# Patient Record
Sex: FEMALE | Race: BLACK OR AFRICAN AMERICAN | NOT HISPANIC OR LATINO | Employment: OTHER | URBAN - METROPOLITAN AREA
[De-identification: names, ages, dates, MRNs, and addresses within clinical notes are randomized per-mention and may not be internally consistent; named-entity substitution may affect disease eponyms.]

---

## 2017-04-24 ENCOUNTER — LAB VISIT (OUTPATIENT)
Dept: LAB | Facility: HOSPITAL | Age: 67
End: 2017-04-24
Attending: INTERNAL MEDICINE
Payer: MEDICARE

## 2017-04-24 ENCOUNTER — OFFICE VISIT (OUTPATIENT)
Dept: FAMILY MEDICINE | Facility: CLINIC | Age: 67
End: 2017-04-24
Payer: MEDICARE

## 2017-04-24 VITALS
WEIGHT: 177.69 LBS | TEMPERATURE: 98 F | BODY MASS INDEX: 31.48 KG/M2 | HEIGHT: 63 IN | DIASTOLIC BLOOD PRESSURE: 90 MMHG | SYSTOLIC BLOOD PRESSURE: 170 MMHG | HEART RATE: 78 BPM | OXYGEN SATURATION: 98 %

## 2017-04-24 DIAGNOSIS — Z11.59 NEED FOR HEPATITIS C SCREENING TEST: ICD-10-CM

## 2017-04-24 DIAGNOSIS — E11.42 TYPE 2 DIABETES MELLITUS WITH DIABETIC POLYNEUROPATHY, WITH LONG-TERM CURRENT USE OF INSULIN: Primary | ICD-10-CM

## 2017-04-24 DIAGNOSIS — Z12.31 ENCOUNTER FOR SCREENING MAMMOGRAM FOR MALIGNANT NEOPLASM OF BREAST: ICD-10-CM

## 2017-04-24 DIAGNOSIS — Z12.4 ENCOUNTER FOR SCREENING FOR CERVICAL CANCER: ICD-10-CM

## 2017-04-24 DIAGNOSIS — E78.2 MIXED HYPERLIPIDEMIA: Chronic | ICD-10-CM

## 2017-04-24 DIAGNOSIS — Z79.4 TYPE 2 DIABETES MELLITUS WITH DIABETIC POLYNEUROPATHY, WITH LONG-TERM CURRENT USE OF INSULIN: Primary | ICD-10-CM

## 2017-04-24 DIAGNOSIS — I10 ESSENTIAL HYPERTENSION: Chronic | ICD-10-CM

## 2017-04-24 DIAGNOSIS — Z79.4 TYPE 2 DIABETES MELLITUS WITH DIABETIC POLYNEUROPATHY, WITH LONG-TERM CURRENT USE OF INSULIN: ICD-10-CM

## 2017-04-24 DIAGNOSIS — E11.42 TYPE 2 DIABETES MELLITUS WITH DIABETIC POLYNEUROPATHY, WITH LONG-TERM CURRENT USE OF INSULIN: ICD-10-CM

## 2017-04-24 PROBLEM — E11.9 TYPE 2 DIABETES MELLITUS WITHOUT COMPLICATION: Chronic | Status: ACTIVE | Noted: 2017-04-24

## 2017-04-24 LAB
ALBUMIN SERPL BCP-MCNC: 3.5 G/DL
ALP SERPL-CCNC: 118 U/L
ALT SERPL W/O P-5'-P-CCNC: 21 U/L
ANION GAP SERPL CALC-SCNC: 8 MMOL/L
AST SERPL-CCNC: 14 U/L
BILIRUB SERPL-MCNC: 0.6 MG/DL
BUN SERPL-MCNC: 14 MG/DL
CALCIUM SERPL-MCNC: 9.4 MG/DL
CHLORIDE SERPL-SCNC: 106 MMOL/L
CHOLEST/HDLC SERPL: 5.3 {RATIO}
CO2 SERPL-SCNC: 26 MMOL/L
CREAT SERPL-MCNC: 1.1 MG/DL
EST. GFR  (AFRICAN AMERICAN): >60 ML/MIN/1.73 M^2
EST. GFR  (NON AFRICAN AMERICAN): 52.8 ML/MIN/1.73 M^2
ESTIMATED AVG GLUCOSE: 258 MG/DL
GLUCOSE SERPL-MCNC: 265 MG/DL
HBA1C MFR BLD HPLC: 10.6 %
HDL/CHOLESTEROL RATIO: 18.8 %
HDLC SERPL-MCNC: 191 MG/DL
HDLC SERPL-MCNC: 36 MG/DL
LDLC SERPL CALC-MCNC: 131.6 MG/DL
NONHDLC SERPL-MCNC: 155 MG/DL
POTASSIUM SERPL-SCNC: 3.6 MMOL/L
PROT SERPL-MCNC: 7.7 G/DL
SODIUM SERPL-SCNC: 140 MMOL/L
TRIGL SERPL-MCNC: 117 MG/DL
TSH SERPL DL<=0.005 MIU/L-ACNC: 0.75 UIU/ML

## 2017-04-24 PROCEDURE — 99203 OFFICE O/P NEW LOW 30 MIN: CPT | Mod: PBBFAC,PO | Performed by: INTERNAL MEDICINE

## 2017-04-24 PROCEDURE — 86803 HEPATITIS C AB TEST: CPT

## 2017-04-24 PROCEDURE — 80061 LIPID PANEL: CPT

## 2017-04-24 PROCEDURE — 99999 PR PBB SHADOW E&M-NEW PATIENT-LVL III: CPT | Mod: PBBFAC,,, | Performed by: INTERNAL MEDICINE

## 2017-04-24 PROCEDURE — 83036 HEMOGLOBIN GLYCOSYLATED A1C: CPT

## 2017-04-24 PROCEDURE — 99499 UNLISTED E&M SERVICE: CPT | Mod: ,,, | Performed by: INTERNAL MEDICINE

## 2017-04-24 PROCEDURE — 84443 ASSAY THYROID STIM HORMONE: CPT

## 2017-04-24 PROCEDURE — 99203 OFFICE O/P NEW LOW 30 MIN: CPT | Mod: S$GLB,,, | Performed by: INTERNAL MEDICINE

## 2017-04-24 PROCEDURE — 80053 COMPREHEN METABOLIC PANEL: CPT

## 2017-04-24 PROCEDURE — 36415 COLL VENOUS BLD VENIPUNCTURE: CPT | Mod: PO

## 2017-04-24 PROCEDURE — 99499 UNLISTED E&M SERVICE: CPT | Mod: S$GLB,,, | Performed by: INTERNAL MEDICINE

## 2017-04-24 RX ORDER — INSULIN GLARGINE 100 [IU]/ML
40 INJECTION, SOLUTION SUBCUTANEOUS DAILY
COMMUNITY
End: 2017-04-24 | Stop reason: SDUPTHER

## 2017-04-24 RX ORDER — METOPROLOL TARTRATE 100 MG/1
100 TABLET ORAL 2 TIMES DAILY
Qty: 180 TABLET | Refills: 3 | Status: SHIPPED | OUTPATIENT
Start: 2017-04-24

## 2017-04-24 RX ORDER — FENOFIBRATE 134 MG/1
134 CAPSULE ORAL
COMMUNITY
End: 2017-04-24 | Stop reason: SDUPTHER

## 2017-04-24 RX ORDER — DEXTROSE 4 G
1 TABLET,CHEWABLE ORAL DAILY
Qty: 1 EACH | Refills: 0 | Status: SHIPPED | OUTPATIENT
Start: 2017-04-24 | End: 2017-05-08 | Stop reason: SDUPTHER

## 2017-04-24 RX ORDER — LANCETS
1 EACH MISCELLANEOUS 2 TIMES DAILY
Qty: 100 EACH | Refills: 11 | Status: SHIPPED | OUTPATIENT
Start: 2017-04-24 | End: 2017-05-08 | Stop reason: SDUPTHER

## 2017-04-24 RX ORDER — METFORMIN HYDROCHLORIDE 1000 MG/1
1000 TABLET ORAL 2 TIMES DAILY WITH MEALS
Qty: 180 TABLET | Refills: 3 | Status: SHIPPED | OUTPATIENT
Start: 2017-04-24

## 2017-04-24 RX ORDER — METFORMIN HYDROCHLORIDE 1000 MG/1
1000 TABLET ORAL 2 TIMES DAILY WITH MEALS
COMMUNITY
End: 2017-04-24 | Stop reason: SDUPTHER

## 2017-04-24 RX ORDER — METOPROLOL TARTRATE 100 MG/1
100 TABLET ORAL 2 TIMES DAILY
COMMUNITY
End: 2017-04-24 | Stop reason: SDUPTHER

## 2017-04-24 RX ORDER — INSULIN GLARGINE 100 [IU]/ML
10 INJECTION, SOLUTION SUBCUTANEOUS DAILY
Qty: 3 ML | Refills: 11 | Status: SHIPPED | OUTPATIENT
Start: 2017-04-24

## 2017-04-24 RX ORDER — FENOFIBRATE 134 MG/1
134 CAPSULE ORAL
Qty: 90 CAPSULE | Refills: 3 | Status: SHIPPED | OUTPATIENT
Start: 2017-04-24

## 2017-04-24 NOTE — PROGRESS NOTES
Assessment & Plan  Type 2 diabetes mellitus with diabetic polyneuropathy, with long-term current use of insulin-per pharmacy records, last prescription was for Lantus 40 units though she's only been taking 10 units.  Check A1c.  She's been out of her medicines for a month so I expect that it will be high.  Refilled metformin, refilled insulin.  Refilled for glucometer and test strips and lancets.  Twice daily testing for now.  Fasting today, check CMP, lipid, TSH, A1c.  She'll need microalbumin in the future but given her uncontrolled pressures, would wait for now.  -     Diabetic Eye Screening Photo; Future  -     metformin (GLUCOPHAGE) 1000 MG tablet; Take 1 tablet (1,000 mg total) by mouth 2 (two) times daily with meals.  Dispense: 180 tablet; Refill: 3  -     insulin glargine (LANTUS SOLOSTAR) 100 unit/mL (3 mL) InPn pen; Inject 10 Units into the skin once daily.  Dispense: 3 mL; Refill: 11  -     Comprehensive metabolic panel; Future; Expected date: 4/24/17  -     Lipid panel; Future; Expected date: 4/24/17  -     Hemoglobin A1c; Future; Expected date: 4/24/17  -     TSH; Future; Expected date: 4/24/17    Mixed hyperlipidemia-refilled fenofibrate.  Unclear if she has tried statins in the past with intolerance.  Old records request.  Check lipid profile today.  Out of meds for the past month.  -     fenofibrate micronized (LOFIBRA) 134 MG Cap; Take 1 capsule (134 mg total) by mouth daily with breakfast.  Dispense: 90 capsule; Refill: 3    Essential hypertension-not to goal today.  Unsure if she's had ACE inhibitor or ARB in the past.  Currently on beta blocker.  Old records.  Refilled today.  -     metoprolol tartrate (LOPRESSOR) 100 MG tablet; Take 1 tablet (100 mg total) by mouth 2 (two) times daily.  Dispense: 180 tablet; Refill: 3    Encounter for screening for cervical cancer   -     Ambulatory referral to Gynecology    Encounter for screening mammogram for malignant neoplasm of breast  -     Mammo Digital  Screening Bilateral With CAD; Future; Expected date: 4/24/17    Need for hepatitis C screening test  -     Hepatitis C antibody; Future; Expected date: 4/24/17    Other orders  -     blood-glucose meter Misc; 1 each by Misc.(Non-Drug; Combo Route) route once daily.  Dispense: 1 each; Refill: 0  -     blood sugar diagnostic Strp; TWICE DAILY BLOOD SUGAR TESTING; WHICHEVER BRAND COVERED BY INSURANCE  Dispense: 60 strip; Refill: 11  -     lancets (ACCU-CHEK SOFTCLIX LANCETS) Misc; 1 each by Misc.(Non-Drug; Combo Route) route 2 (two) times daily. Whichever brand covered by insurance  Dispense: 100 each; Refill: 11      Medications Discontinued During This Encounter   Medication Reason    metformin (GLUCOPHAGE) 1000 MG tablet Reorder    metoprolol tartrate (LOPRESSOR) 100 MG tablet Reorder    fenofibrate micronized (LOFIBRA) 134 MG Cap Reorder    insulin glargine (LANTUS SOLOSTAR) 100 unit/mL (3 mL) InPn pen Reorder       Follow-up: No Follow-up on file. old records request.  Labs as above.  Follow-up 2 weeks, follow-up blood pressure, lab test results.  Consider gabapentin for the peripheral neuropathy.  She'll need a foot exam.  Old records request, hopefully will be able to give insight into previously tried medications and any medication ALLERGIES.      =================================================================      Chief Complaint   Patient presents with    Establish Care    Medication Refill       LAKEISHA  Viviana is a 66 y.o. female, last appointment with this clinic was Visit date not found.    No LMP recorded. Patient is postmenopausal.    Out of medication for the past month.  Previous PCP Dr. Church - she only works on Fridays.  But apparently there was an issue with the listed PCP on her medical card. Was seeing [facility].    Knows she takes several medicines but did not bring in any of her medicines, does not know the names/doses, does not know the number of meds total that she  takes.    Does know she takes metformin.  Is also taking insulin.    Has a home cuff but doesn't trust it.     Has 4 biological children but caring for 3 grandchildren currently.     BP very high.  Notes high stress this AM getting kids out of bed.  Better on repeat but still very high.  No chest pain, dyspnea. No headache.    Gets her meds mail order but we were able to call the local pharmacy and get list.  Notes insulin she uses 10 units.  Metformin 1000 BID.  toprol 100 mg.      Notes peripheral neuropathy - burning in the feet; never meds for this.    Eye doctor Dr. Carrizales - had cataract surgery.  < 1 year ago.  Reports that was the last time she had had labs done.  Fasting today.    Needs to update pap smear.  And mammo.    Patient Care Team:  Cory Buenrostro MD as PCP - General (Internal Medicine)    There are no active problems to display for this patient.      PAST MEDICAL HISTORY:  Past Medical History:   Diagnosis Date    Diabetes mellitus, type 2     Hypertension     Hyperthyroidism        PAST SURGICAL HISTORY:  History reviewed. No pertinent surgical history.    Family History   Problem Relation Age of Onset    Hypertension Mother     Hypertension Father     Hypertension Sister     Diabetes Sister     Hypertension Sister     Diabetes Sister     No Known Problems Daughter     No Known Problems Daughter     No Known Problems Daughter     No Known Problems Daughter        SOCIAL HISTORY:  Social History     Social History    Marital status:      Spouse name: N/A    Number of children: N/A    Years of education: N/A     Occupational History    Not on file.     Social History Main Topics    Smoking status: Never Smoker    Smokeless tobacco: Not on file    Alcohol use No    Drug use: No    Sexual activity: No     Other Topics Concern    Not on file     Social History Narrative    No narrative on file       ALLERGIES AND MEDICATIONS: updated and reviewed.  Review of  "patient's allergies indicates:  No Known Allergies  No current outpatient prescriptions on file.     No current facility-administered medications for this visit.        Review of Systems   Constitutional: Negative for fever, malaise/fatigue and weight loss.   HENT: Negative for congestion.    Eyes: Negative for blurred vision and pain.   Respiratory: Negative for shortness of breath and wheezing.    Cardiovascular: Negative for chest pain, palpitations and leg swelling.   Gastrointestinal: Negative for abdominal pain, blood in stool, constipation, diarrhea and heartburn.   Genitourinary: Negative for dysuria, hematuria and urgency.   Musculoskeletal: Negative for joint pain.   Neurological: Positive for tingling and sensory change. Negative for focal weakness, weakness and headaches.        Foot neuropathy   Psychiatric/Behavioral: Negative for depression. The patient is not nervous/anxious.        Physical Exam   Vitals:    04/24/17 0937 04/24/17 0955   BP: (!) 206/96 (!) 170/90   BP Location:  Left arm   Patient Position:  Sitting   Pulse: 78    Temp: 98.3 °F (36.8 °C)    SpO2: 98%    Weight: 80.6 kg (177 lb 11.1 oz)    Height: 5' 3" (1.6 m)     There is no height or weight on file to calculate BMI.            Physical Exam   Constitutional: She is oriented to person, place, and time. She appears well-developed and well-nourished. No distress.   Eyes: EOM are normal.   Cardiovascular: Normal rate, regular rhythm and normal heart sounds.    No murmur heard.  Pulmonary/Chest: Effort normal and breath sounds normal.   Musculoskeletal: Normal range of motion.   Neurological: She is alert and oriented to person, place, and time. Coordination normal.   Skin: Skin is warm and dry.   Psychiatric: She has a normal mood and affect. Her behavior is normal. Thought content normal.     "

## 2017-04-24 NOTE — MR AVS SNAPSHOT
[facility]  [address]  [place] [address]  Phone: [phone]  Fax: [phone]                  Viviana Jones   2017 9:40 AM   Office Visit    Description:  Female : 1950   Provider:  Cory Buenrostro MD   Department:  [facility]           Reason for Visit     Establish Care     Medication Refill           Diagnoses this Visit        Comments    Type 2 diabetes mellitus with diabetic polyneuropathy, with long-term current use of insulin    -  Primary     Mixed hyperlipidemia         Essential hypertension         Encounter for screening for cervical cancer          Encounter for screening mammogram for malignant neoplasm of breast         Need for hepatitis C screening test                To Do List           Future Appointments        Provider Department Dept Phone    2017 10:15 AM LAB, [facility] [phone]    2017 11:00 AM EYECAM, DIABETES [facility] [phone]    2017 11:30 AM LAPH MAMMO1 [facility] [phone]    2017 9:00 AM Cory Buenrostro MD [facility] [phone]      Goals (5 Years of Data)     None       These Medications        Disp Refills Start End    metformin (GLUCOPHAGE) 1000 MG tablet 180 tablet 3 2017     Take 1 tablet (1,000 mg total) by mouth 2 (two) times daily with meals. - Oral    Pharmacy: [facility] - [place], [place] - [address] Ph #: [phone]       metoprolol tartrate (LOPRESSOR) 100 MG tablet 180 tablet 3 2017     Take 1 tablet (100 mg total) by mouth 2 (two) times daily. - Oral    Pharmacy: [facility] - [place] ([place], [place] - [address] Ph #: [phone]       fenofibrate micronized (LOFIBRA) 134 MG Cap 90 capsule 3 2017     Take 1 capsule (134 mg total) by mouth daily with breakfast. - Oral    Pharmacy: [facility] [place] [facility] [place] -  [address] [place] [address] Ph #: [phone]       insulin glargine (LANTUS SOLOSTAR) 100 unit/mL (3 mL) InPn pen 3 mL 11 4/24/2017     Inject 10 Units into the skin once daily. - Subcutaneous    Pharmacy: [address], [place] - [address] [place] [address] Ph #: [phone]       blood-glucose meter Misc 1 each 0 4/24/2017 4/24/2018    1 each by Misc.(Non-Drug; Combo Route) route once daily. - Misc.(Non-Drug; Combo Route)    Pharmacy: [address] ([address] Ph #: [phone]       blood sugar diagnostic Strp 60 strip 11 4/24/2017 5/29/2018    TWICE DAILY BLOOD SUGAR TESTING; WHICHEVER BRAND COVERED BY INSURANCE    Pharmacy: [address] Ph #: [phone]       lancets (ACCU-CHEK SOFTCLIX LANCETS) Misc 100 each 11 4/24/2017     1 each by Misc.(Non-Drug; Combo Route) route 2 (two) times daily. Whichever brand covered by insurance - Misc.(Non-Drug; Combo Route)    Pharmacy: [address], [place] - [address] [place] [address] Ph #: [phone]         [facility]     [facility] On Call Nurse Care Line - 24/7 Assistance  Unless otherwise directed by your provider, please contact [facility] On-Call, our nurse care line that is available for 24/7 assistance.     Registered nurses in the [facility] Center provide: appointment scheduling, clinical advisement, health education, and other advisory services.  Call: [phone] (toll free)               Medications           START taking these NEW medications        Refills    metformin (GLUCOPHAGE) 1000 MG tablet 3    Sig: Take 1 tablet (1,000 mg total) by mouth 2 (two) times daily with meals.    Class: Normal    Route: Oral    metoprolol tartrate (LOPRESSOR) 100 MG tablet 3    Sig: Take 1 tablet (100 mg total) by mouth 2 (two) times daily.    Class: Normal    Route: Oral    fenofibrate micronized (LOFIBRA) 134 MG Cap 3    Sig: Take 1 capsule (134  mg total) by mouth daily with breakfast.    Class: Normal    Route: Oral    insulin glargine (LANTUS SOLOSTAR) 100 unit/mL (3 mL) InPn pen 11    Sig: Inject 10 Units into the skin once daily.    Class: Normal    Route: Subcutaneous    blood-glucose meter Misc 0    Si each by Misc.(Non-Drug; Combo Route) route once daily.    Class: Normal    Route: Misc.(Non-Drug; Combo Route)    blood sugar diagnostic Strp 11    Sig: TWICE DAILY BLOOD SUGAR TESTING; WHICHEVER BRAND COVERED BY INSURANCE    Class: Normal    lancets (ACCU-CHEK SOFTCLIX LANCETS) Misc 11    Si each by Misc.(Non-Drug; Combo Route) route 2 (two) times daily. Whichever brand covered by insurance    Class: Normal    Route: Misc.(Non-Drug; Combo Route)           Verify that the below list of medications is an accurate representation of the medications you are currently taking.  If none reported, the list may be blank. If incorrect, please contact your healthcare provider. Carry this list with you in case of emergency.           Current Medications     fenofibrate micronized (LOFIBRA) 134 MG Cap Take 1 capsule (134 mg total) by mouth daily with breakfast.    insulin glargine (LANTUS SOLOSTAR) 100 unit/mL (3 mL) InPn pen Inject 10 Units into the skin once daily.    metformin (GLUCOPHAGE) 1000 MG tablet Take 1 tablet (1,000 mg total) by mouth 2 (two) times daily with meals.    metoprolol tartrate (LOPRESSOR) 100 MG tablet Take 1 tablet (100 mg total) by mouth 2 (two) times daily.    blood sugar diagnostic Strp TWICE DAILY BLOOD SUGAR TESTING; WHICHEVER BRAND COVERED BY INSURANCE    blood-glucose meter Misc 1 each by Misc.(Non-Drug; Combo Route) route once daily.    lancets (ACCU-CHEK SOFTCLIX LANCETS) Misc 1 each by Misc.(Non-Drug; Combo Route) route 2 (two) times daily. Whichever brand covered by insurance           Clinical Reference Information           Your Vitals Were     BP Pulse Temp Height Weight SpO2    170/90 (BP Location: Left arm, Patient  "Position: Sitting) 78 98.3 °F (36.8 °C) 5' 3" (1.6 m) 80.6 kg (177 lb 11.1 oz) 98%    BMI                31.48 kg/m2          Blood Pressure          Most Recent Value    BP  (!)  170/90      Allergies as of 4/24/2017     No Known Allergies      Immunizations Administered on Date of Encounter - 4/24/2017     None      Orders Placed During Today's Visit      Normal Orders This Visit    Ambulatory referral to Gynecology     Future Labs/Procedures Expected by Expires    Comprehensive metabolic panel  4/24/2017 4/24/2018    Hemoglobin A1c  4/24/2017 4/24/2018    Hepatitis C antibody  4/24/2017 6/23/2018    Lipid panel  4/24/2017 4/24/2018    Mammo Digital Screening Bilateral With CAD  4/24/2017 6/24/2018    TSH  4/24/2017 4/24/2018    Diabetic Eye Screening Photo  As directed 4/25/2018      Language Assistance Services     ATTENTION: Language assistance services are available, free of charge. Please call [phone].      ATENCIÓN: Si habla mercedes, tiene a millard disposición servicios gratuitos de asistencia lingüística. Llame al [phone].     CHÚ Ý: N?u b?n nói Ti?ng Vi?t, có các d?ch v? h? tr? ngôn ng? mi?n phí dành cho b?n. G?i s? [phone].         [facility] complies with applicable Federal civil rights laws and does not discriminate on the basis of race, color, national origin, age, disability, or sex.        "

## 2017-04-25 ENCOUNTER — CLINICAL SUPPORT (OUTPATIENT)
Dept: OPHTHALMOLOGY | Facility: CLINIC | Age: 67
End: 2017-04-25
Attending: INTERNAL MEDICINE
Payer: MEDICARE

## 2017-04-25 ENCOUNTER — TELEPHONE (OUTPATIENT)
Dept: FAMILY MEDICINE | Facility: CLINIC | Age: 67
End: 2017-04-25

## 2017-04-25 ENCOUNTER — HOSPITAL ENCOUNTER (OUTPATIENT)
Dept: RADIOLOGY | Facility: HOSPITAL | Age: 67
Discharge: HOME | End: 2017-04-25
Attending: INTERNAL MEDICINE
Payer: MEDICARE

## 2017-04-25 DIAGNOSIS — E11.42 TYPE 2 DIABETES MELLITUS WITH DIABETIC POLYNEUROPATHY, WITH LONG-TERM CURRENT USE OF INSULIN: ICD-10-CM

## 2017-04-25 DIAGNOSIS — Z79.4 TYPE 2 DIABETES MELLITUS WITH DIABETIC POLYNEUROPATHY, WITH LONG-TERM CURRENT USE OF INSULIN: ICD-10-CM

## 2017-04-25 DIAGNOSIS — Z12.31 ENCOUNTER FOR SCREENING MAMMOGRAM FOR MALIGNANT NEOPLASM OF BREAST: ICD-10-CM

## 2017-04-25 LAB — HCV AB SERPL QL IA: NEGATIVE

## 2017-04-25 PROCEDURE — 92227 IMG RTA DETCJ/MNTR DS STAFF: CPT | Mod: S$GLB,,, | Performed by: OPHTHALMOLOGY

## 2017-04-25 PROCEDURE — 77067 SCR MAMMO BI INCL CAD: CPT | Mod: 26,,, | Performed by: RADIOLOGY

## 2017-04-25 PROCEDURE — 77063 BREAST TOMOSYNTHESIS BI: CPT | Mod: 26,,, | Performed by: RADIOLOGY

## 2017-04-25 NOTE — PROGRESS NOTES
A1c high, 10.6.  Has been out of her medications including insulin for the past month.  On metformin and insulin 10 units.  Previously had been on 40 units with her previous doctor.  Likely will need to increase the dose.  For now would monitor and repeat labs in 3 months.  Lipid high, again, out of meds for the past month.  On fenofibrate  Metabolic panel stable  Screening hep C negative  Results to patient.  Follow-up scheduled in one month; hopefully old records received by then

## 2017-04-25 NOTE — TELEPHONE ENCOUNTER
Patient has an appointment 5/9/17 2pm with Dr. Bolden at [facility], patient was notified.

## 2017-04-25 NOTE — PROGRESS NOTES
HPI     66 year old female is here for screening of Diabetic Retinopathy with   non-dilated fundus photos per .The eye cam was unable to take clear   photos due to the eye cam stating patient pupils are too small.        Last edited by Michael Kaur on 4/25/2017 10:06 AM.         Assessment /Plan     For exam results, see Encounter Report.    Type 2 diabetes mellitus with diabetic polyneuropathy, with long-term current use of insulin  -     Diabetic Eye Screening Photo      Please see  progress note for interpretation.

## 2017-04-27 ENCOUNTER — TELEPHONE (OUTPATIENT)
Dept: OPHTHALMOLOGY | Facility: CLINIC | Age: 67
End: 2017-04-27

## 2017-04-28 ENCOUNTER — TELEPHONE (OUTPATIENT)
Dept: FAMILY MEDICINE | Facility: CLINIC | Age: 67
End: 2017-04-28

## 2017-04-28 DIAGNOSIS — Z79.4 TYPE 2 DIABETES MELLITUS WITH DIABETIC POLYNEUROPATHY, WITH LONG-TERM CURRENT USE OF INSULIN: ICD-10-CM

## 2017-04-28 DIAGNOSIS — E11.42 TYPE 2 DIABETES MELLITUS WITH DIABETIC POLYNEUROPATHY, WITH LONG-TERM CURRENT USE OF INSULIN: ICD-10-CM

## 2017-04-28 NOTE — TELEPHONE ENCOUNTER
----- Message from Lucille Black sent at 4/28/2017 12:01 PM CDT -----  Contact: self  [phone]  Pt is requesting a script for test strips. Pls call [facility]. Thanks.......Trina

## 2017-05-05 ENCOUNTER — TELEPHONE (OUTPATIENT)
Dept: FAMILY MEDICINE | Facility: CLINIC | Age: 67
End: 2017-05-05

## 2017-05-05 NOTE — TELEPHONE ENCOUNTER
Pt with abnormal mammogram.  Did she get contacted by the radiology/breast center to follow up on this?  Needs further testing with them.

## 2017-05-08 ENCOUNTER — TELEPHONE (OUTPATIENT)
Dept: FAMILY MEDICINE | Facility: CLINIC | Age: 67
End: 2017-05-08

## 2017-05-08 DIAGNOSIS — Z79.4 TYPE 2 DIABETES MELLITUS WITH DIABETIC POLYNEUROPATHY, WITH LONG-TERM CURRENT USE OF INSULIN: ICD-10-CM

## 2017-05-08 DIAGNOSIS — E11.42 TYPE 2 DIABETES MELLITUS WITH DIABETIC POLYNEUROPATHY, WITH LONG-TERM CURRENT USE OF INSULIN: ICD-10-CM

## 2017-05-08 RX ORDER — LANCETS
1 EACH MISCELLANEOUS 2 TIMES DAILY
Qty: 100 EACH | Refills: 11 | Status: SHIPPED | OUTPATIENT
Start: 2017-05-08

## 2017-05-08 RX ORDER — DEXTROSE 4 G
1 TABLET,CHEWABLE ORAL DAILY
Qty: 1 EACH | Refills: 0 | Status: SHIPPED | OUTPATIENT
Start: 2017-05-08 | End: 2018-05-08

## 2017-05-08 NOTE — TELEPHONE ENCOUNTER
Pt pharmacy is calling asking for a new rx for a meter can you please sent to her pharmacy which is [facility] [employer] [employer] Liana miranda thank you

## 2017-05-09 ENCOUNTER — OFFICE VISIT (OUTPATIENT)
Dept: OBSTETRICS AND GYNECOLOGY | Facility: CLINIC | Age: 67
End: 2017-05-09
Payer: MEDICARE

## 2017-05-09 ENCOUNTER — OFFICE VISIT (OUTPATIENT)
Dept: FAMILY MEDICINE | Facility: CLINIC | Age: 67
End: 2017-05-09
Payer: MEDICARE

## 2017-05-09 VITALS
TEMPERATURE: 98 F | OXYGEN SATURATION: 98 % | BODY MASS INDEX: 31.48 KG/M2 | HEART RATE: 61 BPM | DIASTOLIC BLOOD PRESSURE: 90 MMHG | SYSTOLIC BLOOD PRESSURE: 160 MMHG | HEIGHT: 63 IN | WEIGHT: 177.69 LBS

## 2017-05-09 VITALS
HEIGHT: 63 IN | WEIGHT: 175.25 LBS | BODY MASS INDEX: 31.05 KG/M2 | SYSTOLIC BLOOD PRESSURE: 150 MMHG | DIASTOLIC BLOOD PRESSURE: 70 MMHG

## 2017-05-09 DIAGNOSIS — I10 ESSENTIAL HYPERTENSION: Primary | Chronic | ICD-10-CM

## 2017-05-09 DIAGNOSIS — E11.42 TYPE 2 DIABETES MELLITUS WITH DIABETIC POLYNEUROPATHY, WITH LONG-TERM CURRENT USE OF INSULIN: ICD-10-CM

## 2017-05-09 DIAGNOSIS — Z01.419 ENCOUNTER FOR ANNUAL ROUTINE GYNECOLOGICAL EXAMINATION: Primary | ICD-10-CM

## 2017-05-09 DIAGNOSIS — Z23 NEED FOR VACCINATION FOR STREP PNEUMONIAE: ICD-10-CM

## 2017-05-09 DIAGNOSIS — E78.2 MIXED HYPERLIPIDEMIA: Chronic | ICD-10-CM

## 2017-05-09 DIAGNOSIS — Z12.11 SCREENING FOR COLON CANCER: ICD-10-CM

## 2017-05-09 DIAGNOSIS — R92.8 ABNORMAL MAMMOGRAM OF LEFT BREAST: ICD-10-CM

## 2017-05-09 DIAGNOSIS — Z13.820 ENCOUNTER FOR SCREENING FOR OSTEOPOROSIS: ICD-10-CM

## 2017-05-09 DIAGNOSIS — Z23 NEED FOR DIPHTHERIA-TETANUS-PERTUSSIS (TDAP) VACCINE, ADULT/ADOLESCENT: ICD-10-CM

## 2017-05-09 DIAGNOSIS — F51.01 PRIMARY INSOMNIA: ICD-10-CM

## 2017-05-09 DIAGNOSIS — Z79.4 TYPE 2 DIABETES MELLITUS WITH DIABETIC POLYNEUROPATHY, WITH LONG-TERM CURRENT USE OF INSULIN: ICD-10-CM

## 2017-05-09 PROCEDURE — 99499 UNLISTED E&M SERVICE: CPT | Mod: S$PBB,,, | Performed by: INTERNAL MEDICINE

## 2017-05-09 PROCEDURE — 99214 OFFICE O/P EST MOD 30 MIN: CPT | Mod: 25,S$GLB,, | Performed by: INTERNAL MEDICINE

## 2017-05-09 PROCEDURE — 88175 CYTOPATH C/V AUTO FLUID REDO: CPT

## 2017-05-09 PROCEDURE — G0101 CA SCREEN;PELVIC/BREAST EXAM: HCPCS | Mod: S$GLB,,, | Performed by: OBSTETRICS & GYNECOLOGY

## 2017-05-09 PROCEDURE — 90670 PCV13 VACCINE IM: CPT | Mod: S$GLB,,, | Performed by: INTERNAL MEDICINE

## 2017-05-09 PROCEDURE — G0009 ADMIN PNEUMOCOCCAL VACCINE: HCPCS | Mod: 59,S$GLB,, | Performed by: INTERNAL MEDICINE

## 2017-05-09 PROCEDURE — 90471 IMMUNIZATION ADMIN: CPT | Mod: 59,S$GLB,, | Performed by: INTERNAL MEDICINE

## 2017-05-09 PROCEDURE — 99999 PR PBB SHADOW E&M-EST. PATIENT-LVL III: CPT | Mod: PBBFAC,,, | Performed by: OBSTETRICS & GYNECOLOGY

## 2017-05-09 PROCEDURE — 99999 PR PBB SHADOW E&M-EST. PATIENT-LVL V: CPT | Mod: PBBFAC,,, | Performed by: INTERNAL MEDICINE

## 2017-05-09 PROCEDURE — 90715 TDAP VACCINE 7 YRS/> IM: CPT | Mod: S$GLB,,, | Performed by: INTERNAL MEDICINE

## 2017-05-09 RX ORDER — LISINOPRIL 20 MG/1
20 TABLET ORAL DAILY
Qty: 90 TABLET | Refills: 3 | Status: SHIPPED | OUTPATIENT
Start: 2017-05-09 | End: 2018-05-09

## 2017-05-09 RX ORDER — AMITRIPTYLINE HYDROCHLORIDE 25 MG/1
25 TABLET, FILM COATED ORAL NIGHTLY PRN
Qty: 90 TABLET | Refills: 3 | Status: SHIPPED | OUTPATIENT
Start: 2017-05-09

## 2017-05-09 RX ORDER — AMITRIPTYLINE HYDROCHLORIDE 25 MG/1
25 TABLET, FILM COATED ORAL NIGHTLY PRN
COMMUNITY
End: 2017-05-09 | Stop reason: SDUPTHER

## 2017-05-09 RX ORDER — PRAVASTATIN SODIUM 40 MG/1
40 TABLET ORAL DAILY
COMMUNITY
End: 2017-05-09 | Stop reason: SDUPTHER

## 2017-05-09 RX ORDER — PRAVASTATIN SODIUM 40 MG/1
40 TABLET ORAL DAILY
Qty: 90 TABLET | Refills: 3 | Status: SHIPPED | OUTPATIENT
Start: 2017-05-09

## 2017-05-09 NOTE — LETTER
May 11, 2017      Cory Buenrsotro MD  [address] [facility] [place] [address]           [facility]  [address] [facility] [address]  [facility] [address]  Phone: [phone]          Patient: Viviana Jones   MR Number: [id number]   YOB: 1950   Date of Visit: 5/9/2017       Dear Dr. Cory Buenrostro:    Thank you for referring Viviana Jones to me for evaluation. Attached you will find relevant portions of my assessment and plan of care.    If you have questions, please do not hesitate to call me. I look forward to following Viviana Jones along with you.    Sincerely,    Kiersten Bolden MD    Enclosure  CC:  No Recipients    If you would like to receive this communication electronically, please contact [email or web address] or [phone] to request more information on [employer] access.    For providers and/or their staff who would like to refer a patient to [facility], please contact us through our one-stop-shop provider referral line, [facility] , at [phone].    If you feel you have received this communication in error or would no longer like to receive these types of communications, please e-mail [email or web address]

## 2017-05-09 NOTE — MR AVS SNAPSHOT
[facility]  [address]  [place] [address]  Phone: [phone]  Fax: [phone]                  Viviana Jones   2017 9:00 AM   Office Visit    Description:  Female : 1950   Provider:  Cory Buenrostro MD   Department:  [facility]           Reason for Visit     Diabetes     Hypertension           Diagnoses this Visit        Comments    Abnormal mammogram of left breast    -  Primary     Primary insomnia         Type 2 diabetes mellitus with diabetic polyneuropathy, with long-term current use of insulin         Essential hypertension         Mixed hyperlipidemia         Need for diphtheria-tetanus-pertussis (Tdap) vaccine, adult/adolescent         Need for vaccination for Strep pneumoniae         Screening for colon cancer         Encounter for screening for osteoporosis                To Do List           Future Appointments        Provider Department Dept Phone    2017 1:50 PM Kiersten Bolden MD [facility] [place]/ -[phone]      Goals (5 Years of Data)     None       These Medications        Disp Refills Start End    pravastatin (PRAVACHOL) 40 MG tablet 90 tablet 3 2017     Take 1 tablet (40 mg total) by mouth once daily. - Oral    Pharmacy: [facility] - [place] - [address] [place] [address] Ph #: [phone]       amitriptyline (ELAVIL) 25 MG tablet 90 tablet 3 2017     Take 1 tablet (25 mg total) by mouth nightly as needed for Insomnia. - Oral    Pharmacy: [facility] - [place] - [address] Ph #: [phone]       lisinopril (PRINIVIL,ZESTRIL) 20 MG tablet 90 tablet 3 2017    Take 1 tablet (20 mg total) by mouth once daily. - Oral    Pharmacy: [facility] - [place] - [address] [facility] [address] [facility] Ph #: [phone]         [facility]     [facility] Care [facility] -  Assistance  Unless otherwise directed by your provider, please contact [facility], our nurse care line that is available for 24/7 assistance.     Registered nurses in the [facility] provide: appointment scheduling, clinical advisement, health education, and other advisory services.  Call: [phone] (toll free)               Medications           START taking these NEW medications        Refills    pravastatin (PRAVACHOL) 40 MG tablet 3    Sig: Take 1 tablet (40 mg total) by mouth once daily.    Class: Normal    Route: Oral    amitriptyline (ELAVIL) 25 MG tablet 3    Sig: Take 1 tablet (25 mg total) by mouth nightly as needed for Insomnia.    Class: Normal    Route: Oral    lisinopril (PRINIVIL,ZESTRIL) 20 MG tablet 3    Sig: Take 1 tablet (20 mg total) by mouth once daily.    Class: Normal    Route: Oral           Verify that the below list of medications is an accurate representation of the medications you are currently taking.  If none reported, the list may be blank. If incorrect, please contact your healthcare provider. Carry this list with you in case of emergency.           Current Medications     amitriptyline (ELAVIL) 25 MG tablet Take 1 tablet (25 mg total) by mouth nightly as needed for Insomnia.    blood sugar diagnostic Strp TWICE DAILY BLOOD SUGAR TESTING; WHICHEVER BRAND COVERED BY INSURANCE    blood-glucose meter Misc 1 each by Misc.(Non-Drug; Combo Route) route once daily.    fenofibrate micronized (LOFIBRA) 134 MG Cap Take 1 capsule (134 mg total) by mouth daily with breakfast.    insulin glargine (LANTUS SOLOSTAR) 100 unit/mL (3 mL) InPn pen Inject 10 Units into the skin once daily.    lancets (ACCU-CHEK SOFTCLIX LANCETS) Misc 1 each by Misc.(Non-Drug; Combo Route) route 2 (two) times daily. Whichever brand covered by insurance    lisinopril (PRINIVIL,ZESTRIL) 20 MG tablet Take 1 tablet (20 mg total) by mouth once daily.    metformin (GLUCOPHAGE) 1000 MG tablet Take 1 tablet (1,000 mg total) by mouth 2 (two) times daily with meals.    metoprolol tartrate  "(LOPRESSOR) 100 MG tablet Take 1 tablet (100 mg total) by mouth 2 (two) times daily.    pravastatin (PRAVACHOL) 40 MG tablet Take 1 tablet (40 mg total) by mouth once daily.           Clinical Reference Information           Your Vitals Were     BP Pulse Temp Height Weight SpO2    160/90 (BP Location: Left arm, Patient Position: Sitting) 61 98.3 °F (36.8 °C) (Oral) 5' 3" (1.6 m) 80.6 kg (177 lb 11.1 oz) 98%    BMI                31.48 kg/m2          Blood Pressure          Most Recent Value    BP  (!)  160/90      Allergies as of 5/9/2017     No Known Allergies      Immunizations Administered on Date of Encounter - 5/9/2017     Name Date Dose VIS Date Route    Pneumococcal Conjugate - 13 Valent  Incomplete 0.5 mL 11/5/2015 Intramuscular    TDAP  Incomplete 0.5 mL 2/24/2015 Intramuscular      Orders Placed During Today's Visit      Normal Orders This Visit    Case request GI: COLONOSCOPY     Pneumococcal Conjugate Vaccine (13 Valent) (IM)     Tdap Vaccine     Future Labs/Procedures Expected by Expires    DXA Bone Density Spine And Hip  5/9/2017 5/9/2018    Mammo US Breast Biopsy Left  5/9/2017 7/9/2018      Language Assistance Services     ATTENTION: Language assistance services are available, free of charge. Please call [phone].      ATENCIÓN: Si habla elasamra, tiene a millard disposición servicios gratuitos de asistencia lingüística. Llame al [phone].     [facility] Ý: N?u b?n nói Ti?ng Vi?t, có các d?ch v? h? tr? ngôn ng? mi?n phí dành cho b?n. G?i s? [phone].         [facility] complies with applicable Federal civil rights laws and does not discriminate on the basis of race, color, national origin, age, disability, or sex.        "

## 2017-05-09 NOTE — MR AVS SNAPSHOT
[facility] [place] [facility]  [address] [facility] [address]  [place] [address]  Phone: [phone]                  Viviana Jones   2017 1:50 PM   Office Visit    Description:  Female : 1950   Provider:  Kiersten Bolden MD   Department:  [facility] [place] [facility]YN           Reason for Visit     Gynecologic Exam                To Do List           Future Appointments        Provider Department Dept Phone    2017 1:50 PM Kiersten Bolden MD [facility] [place] [place] [facility] -[phone]    2017 9:20 AM Cory Buenrostro MD [facility] [phone]      Goals (5 Years of Data)     None      [facility]     [facility] Care [facility] -  Assistance  Unless otherwise directed by your provider, please contact [facility], our nurse care line that is available for  assistance.     Registered nurses in the [facility] provide: appointment scheduling, clinical advisement, health education, and other advisory services.  Call: [phone] (toll free)               Medications                Verify that the below list of medications is an accurate representation of the medications you are currently taking.  If none reported, the list may be blank. If incorrect, please contact your healthcare provider. Carry this list with you in case of emergency.           Current Medications     amitriptyline (ELAVIL) 25 MG tablet Take 1 tablet (25 mg total) by mouth nightly as needed for Insomnia.    blood sugar diagnostic Strp TWICE DAILY BLOOD SUGAR TESTING; WHICHEVER BRAND COVERED BY INSURANCE    blood-glucose meter Misc 1 each by Misc.(Non-Drug; Combo Route) route once daily.    fenofibrate micronized (LOFIBRA) 134 MG Cap Take 1 capsule (134 mg total) by mouth daily with breakfast.    insulin glargine (LANTUS SOLOSTAR) 100 unit/mL (3 mL) InPn pen Inject 10 Units into the skin once daily.    lancets (ACCU-CHEK SOFTCLIX LANCETS) Misc 1 each by Misc.(Non-Drug; Combo Route) route 2 (two) times daily.  "Whichever brand covered by insurance    lisinopril (PRINIVIL,ZESTRIL) 20 MG tablet Take 1 tablet (20 mg total) by mouth once daily.    metformin (GLUCOPHAGE) 1000 MG tablet Take 1 tablet (1,000 mg total) by mouth 2 (two) times daily with meals.    metoprolol tartrate (LOPRESSOR) 100 MG tablet Take 1 tablet (100 mg total) by mouth 2 (two) times daily.    pravastatin (PRAVACHOL) 40 MG tablet Take 1 tablet (40 mg total) by mouth once daily.           Clinical Reference Information           Your Vitals Were     BP Height Weight BMI       150/70 5' 3" (1.6 m) 79.5 kg (175 lb 4.3 oz) 31.05 kg/m2       Blood Pressure          Most Recent Value    BP  (!)  150/70      Allergies as of 5/9/2017     No Known Allergies      Immunizations Administered on Date of Encounter - 5/9/2017     Name Date Dose VIS Date Route    Pneumococcal Conjugate - 13 Valent 5/9/2017 0.5 mL 11/5/2015 Intramuscular    TDAP 5/9/2017 0.5 mL 2/24/2015 Intramuscular      Language Assistance Services     ATTENTION: Language assistance services are available, free of charge. Please call [phone].      ATENCIÓN: Si habla mercedes, tiene a millard disposición servicios gratuitos de asistencia lingüística. Llame al [phone].     JIMY Ý: N?u b?n nói Ti?ng Vi?t, có các d?ch v? h? tr? ngôn ng? mi?n phí dành cho b?n. G?i s? [phone].         [facility] - [facility]/ [facility]YN complies with applicable Federal civil rights laws and does not discriminate on the basis of race, color, national origin, age, disability, or sex.        "

## 2017-05-09 NOTE — PROGRESS NOTES
Subjective:       Patient ID: Viviana Jones is a 66 y.o. female.    Chief Complaint:  Gynecologic Exam      History of Present Illness  HPI  Viviana Jones is a 66 y.o. female No obstetric history on file. here for annual exam.    She is menopausal since age 51.   denies break through bleeding.   denies vaginal itching or irritation.  denies vaginal discharge.  She is not currently sexually active.   History of abnormal pap: No  Last Pap: patient does not recall when last pap was  Last MMG: Further studies needed--pt needs to call to schedule ultrasound.   Last Colonoscopy:  Pt has not had one.       GYN & OB History  No LMP recorded. Patient is postmenopausal.   Date of Last Pap: 5/10/2017    OB History   No data available       Review of Systems  Review of Systems   Constitutional: Negative for chills, fatigue and fever.   Respiratory: Negative for shortness of breath.    Cardiovascular: Negative for chest pain.   Gastrointestinal: Negative for abdominal pain, constipation, diarrhea, nausea and vomiting.   Genitourinary: Negative for dysuria, frequency, vaginal bleeding, vaginal discharge, vaginal pain, postcoital bleeding, postmenopausal bleeding and vaginal odor.   Breast: Negative for breast mass, breast pain, nipple discharge and skin changes          Objective:    Physical Exam:   Constitutional: She is oriented to person, place, and time. She appears well-developed and well-nourished. No distress.    HENT:   Head: Normocephalic and atraumatic.     Neck: Normal range of motion. No thyromegaly present.    Cardiovascular: Normal rate and normal heart sounds.  Exam reveals no gallop and no friction rub.    No murmur heard.   Pulmonary/Chest: Effort normal and breath sounds normal. No respiratory distress. Right breast exhibits no inverted nipple, no mass, no nipple discharge, no skin change, no tenderness, presence, no bleeding and no swelling. Left breast exhibits no inverted nipple, no mass, no nipple  discharge, no skin change, no tenderness, presence, no bleeding and no swelling. Breasts are symmetrical.        Abdominal: Soft. Normal appearance and bowel sounds are normal. She exhibits no distension. There is no hepatosplenomegaly. There is no tenderness. There is no rigidity, no rebound and no guarding.     Genitourinary: There is no rash, tenderness, lesion or injury on the right labia. There is no rash, tenderness, lesion or injury on the left labia. Uterus is not deviated, not enlarged, not fixed, not tender, not hosting fibroids and not experiencing uterine prolapse. Cervix is normal. No absent adnexa (present). Right adnexum displays no mass, no tenderness and no fullness. Left adnexum displays no mass, no tenderness and no fullness. No erythema, tenderness or bleeding in the vagina. No signs of injury around the vagina. No vaginal discharge found. Cervix exhibits no motion tenderness, no discharge and no friability.   Genitourinary Comments: Urethral meatus normal        Uterus Size: 8 cm      Lymphadenopathy:     She has no cervical adenopathy.     She has no axillary adenopathy.    Neurological: She is alert and oriented to person, place, and time.     Psychiatric: She has a normal mood and affect.          Assessment:        1. Encounter for annual routine gynecological examination              Plan:       1. Encounter for annual routine gynecological examination  - Pap done today. Discussed ASCCP guidelines for screening to stop after age 66yo with negative pap history.   - MMG up to date.   - Colonoscopy pt declines at this point.    - Liquid-based pap smear, screening       Return in about 2 years (around 5/9/2019) for Annual exam.

## 2017-05-09 NOTE — PROGRESS NOTES
Assessment & Plan  Essential hypertension-not to goal.  Stay on metoprolol but add on lisinopril.  Rationale discussed.  She'll need repeat BMP in 1 week on new start ACE inhibitor.  -     lisinopril (PRINIVIL,ZESTRIL) 20 MG tablet; Take 1 tablet (20 mg total) by mouth once daily.  Dispense: 90 tablet; Refill: 3    Primary insomnia-I previously Amitriptyline Helpful, Refilled Today.  -     amitriptyline (ELAVIL) 25 MG tablet; Take 1 tablet (25 mg total) by mouth nightly as needed for Insomnia.  Dispense: 90 tablet; Refill: 3    Abnormal mammogram of left breast-she was unaware that it was abnormal.  Discussed today.  Ordered ultrasound and biopsy if indicated.  -     Mammo US Breast Biopsy Left; Future; Expected date: 5/9/17    Type 2 diabetes mellitus with diabetic polyneuropathy, with long-term current use of insulin-was unable to get her testing supplies, that was sent to the pharmacy.  I've asked her to start monitoring her fasting blood sugars and to bring in her glucometer or log with her to her next visit to adjust her insulin regimen.  She is currently only taking metformin 1000 g once a day and I've instructed her to take it twice a day.  Stay on the Lantus 10 units.    Mixed hyperlipidemia-history of pravastatin, that was refilled today.  She is also taking fenofibrate, stay on both.  -     pravastatin (PRAVACHOL) 40 MG tablet; Take 1 tablet (40 mg total) by mouth once daily.  Dispense: 90 tablet; Refill: 3    Need for diphtheria-tetanus-pertussis (Tdap) vaccine, adult/adolescent  -     Tdap Vaccine    Need for vaccination for Strep pneumoniae  -     Pneumococcal Conjugate Vaccine (13 Valent) (IM)    Screening for colon cancer  -     Case request GI: COLONOSCOPY    Encounter for screening for osteoporosis  -     DXA Bone Density Spine And Hip; Future; Expected date: 5/9/17    Other orders  -     Cancel: MICROALBUMIN / CREATININE RATIO URINE  -     Cancel: Case request GI: COLONOSCOPY        There are no  discontinued medications.    Follow-up: No Follow-up on file. follow-up 1 week, check BMP on new start ACE inhibitor, check microalbumin at that time.  She'll need a foot exam at that time.  Follow-up on blood pressure on new start lisinopril.  Hopefully she'll bring her glucose logs to check her sugars      =================================================================      Chief Complaint   Patient presents with    Diabetes     2 week follow up     Hypertension       LAKEISHA Michelle is a 66 y.o. female, last appointment with this clinic was 4/24/2017.    No LMP recorded. Patient is postmenopausal.    I saw her 2 weeks ago  DM with neuropathy.  Was out of medication, restarted metformin and lantus 10 units.  Hyperlipidemia with statin intolerance.  Fenofibrate  HTN, unclear if previous hx of ACEI or ARB.  Refilled metoprolol last visit.  Labs A1c high expected off meds; lipid high anticipated off meds.  HCV negative.  Abn mammo, unclear if she has follow up for this.    Has not been checking her sugars.  i got message yesterday stating she needed machine and supplies sent to pharmacy yesterday (I had sent at last OV I believe).    She thinks she was taking another type of insulin.  She can't recall what.   But was not taking with mealtime.      Taking her metoprolol.  Now brings in her meds - taking pravastatin. Also amitriptyline for insomnia.  Found it helpful.      Patient Care Team:  Cory Buenrostro MD as PCP - General (Internal Medicine)    Patient Active Problem List    Diagnosis Date Noted    Primary insomnia 05/09/2017    Mixed hyperlipidemia 04/24/2017    Essential hypertension 04/24/2017    Type 2 diabetes mellitus with diabetic polyneuropathy, with long-term current use of insulin 04/24/2017       PAST MEDICAL HISTORY:  Past Medical History:   Diagnosis Date    Diabetes mellitus, type 2     Hypertension     Hyperthyroidism        PAST SURGICAL HISTORY:  Past Surgical History:   Procedure  Laterality Date    CATARACT EXTRACTION         SOCIAL HISTORY:  Social History     Social History    Marital status:      Spouse name: N/A    Number of children: N/A    Years of education: N/A     Occupational History    Not on file.     Social History Main Topics    Smoking status: Never Smoker    Smokeless tobacco: Not on file    Alcohol use No    Drug use: No    Sexual activity: No     Other Topics Concern    Not on file     Social History Narrative       ALLERGIES AND MEDICATIONS: updated and reviewed.  Review of patient's allergies indicates:  No Known Allergies  Current Outpatient Prescriptions   Medication Sig Dispense Refill    amitriptyline (ELAVIL) 25 MG tablet Take 1 tablet (25 mg total) by mouth nightly as needed for Insomnia. 90 tablet 3    blood sugar diagnostic Strp TWICE DAILY BLOOD SUGAR TESTING; WHICHEVER BRAND COVERED BY INSURANCE 60 strip 11    blood-glucose meter Misc 1 each by Misc.(Non-Drug; Combo Route) route once daily. 1 each 0    fenofibrate micronized (LOFIBRA) 134 MG Cap Take 1 capsule (134 mg total) by mouth daily with breakfast. 90 capsule 3    insulin glargine (LANTUS SOLOSTAR) 100 unit/mL (3 mL) InPn pen Inject 10 Units into the skin once daily. 3 mL 11    lancets (ACCU-CHEK SOFTCLIX LANCETS) Misc 1 each by Misc.(Non-Drug; Combo Route) route 2 (two) times daily. Whichever brand covered by insurance 100 each 11    metformin (GLUCOPHAGE) 1000 MG tablet Take 1 tablet (1,000 mg total) by mouth 2 (two) times daily with meals. 180 tablet 3    metoprolol tartrate (LOPRESSOR) 100 MG tablet Take 1 tablet (100 mg total) by mouth 2 (two) times daily. 180 tablet 3    pravastatin (PRAVACHOL) 40 MG tablet Take 1 tablet (40 mg total) by mouth once daily. 90 tablet 3     No current facility-administered medications for this visit.        Review of Systems   Constitutional: Negative for chills and fever.   Respiratory: Negative for shortness of breath.    Cardiovascular:  "Negative for chest pain.       Physical Exam  Vitals:    05/09/17 0852 05/09/17 0911   BP: (!) 200/90 (!) 160/90   BP Location: Left arm Left arm   Patient Position: Sitting Sitting   BP Method: Automatic    Pulse: 61    Temp: 98.3 °F (36.8 °C)    TempSrc: Oral    SpO2: 98%    Weight: 80.6 kg (177 lb 11.1 oz)    Height: 5' 3" (1.6 m)     Body mass index is 31.48 kg/(m^2).  Weight: 80.6 kg (177 lb 11.1 oz)   Height: 5' 3" (160 cm)     Physical Exam   Constitutional: She is oriented to person, place, and time. She appears well-developed and well-nourished. No distress.   Eyes: EOM are normal.   Cardiovascular: Normal rate, regular rhythm and normal heart sounds.    No murmur heard.  Pulmonary/Chest: Effort normal and breath sounds normal.   Musculoskeletal: Normal range of motion.   Neurological: She is alert and oriented to person, place, and time. Coordination normal.   Skin: Skin is warm and dry.   Psychiatric: She has a normal mood and affect. Her behavior is normal. Thought content normal.     "

## 2017-05-26 DIAGNOSIS — E11.9 DIABETES MELLITUS WITHOUT COMPLICATION: ICD-10-CM

## 2017-05-30 ENCOUNTER — OUTPATIENT CASE MANAGEMENT (OUTPATIENT)
Dept: ADMINISTRATIVE | Facility: OTHER | Age: 67
End: 2017-05-30

## 2017-05-30 ENCOUNTER — TELEPHONE (OUTPATIENT)
Dept: ADMINISTRATIVE | Facility: HOSPITAL | Age: 67
End: 2017-05-30

## 2017-05-30 NOTE — PROGRESS NOTES
FYI:    Please note the following patients information has been forwarded to [facility] for Case Management or .    Please contact Outpatient Complex Care Mgmt at ext [phone] with questions.    Thank you    Earline Bejarano, SSC

## 2017-05-30 NOTE — TELEPHONE ENCOUNTER
----- Message from Earline Bejarano sent at 5/30/2017 12:23 PM CDT -----  FYI:     Please note the following patients information has been forwarded to [facility] for Case Management or .     Please contact Outpatient Complex Care Mgmt at ext [phone] with questions.     Thank you     Earline Bejarano, SSC

## 2017-06-08 ENCOUNTER — TELEPHONE (OUTPATIENT)
Dept: RADIOLOGY | Facility: HOSPITAL | Age: 67
End: 2017-06-08

## 2017-06-08 NOTE — TELEPHONE ENCOUNTER
No Answer/Busy - Unable to obtain prior mammograms for comparison. Need to schedule left diagnostic mammogram and left breast ultrasound. No answer      By Jailene Yang, RT

## 2017-06-13 ENCOUNTER — TELEPHONE (OUTPATIENT)
Dept: RADIOLOGY | Facility: HOSPITAL | Age: 67
End: 2017-06-13

## 2017-06-13 NOTE — TELEPHONE ENCOUNTER
No Answer/Busy - Called pt to schedule left diagnostic mammogram & left breast ultrasound. No answer,answering machine full      By Jailene Yang, RT

## 2017-06-20 ENCOUNTER — TELEPHONE (OUTPATIENT)
Dept: RADIOLOGY | Facility: HOSPITAL | Age: 67
End: 2017-06-20

## 2017-06-20 NOTE — TELEPHONE ENCOUNTER
Sent certified letter to patient. Needs additional images on her left breast.      By Jailene Yang, RT

## 2017-06-26 ENCOUNTER — TELEPHONE (OUTPATIENT)
Dept: RADIOLOGY | Facility: HOSPITAL | Age: 67
End: 2017-06-26

## 2017-07-06 ENCOUNTER — HOSPITAL ENCOUNTER (OUTPATIENT)
Dept: RADIOLOGY | Facility: HOSPITAL | Age: 67
Discharge: HOME | End: 2017-07-06
Attending: INTERNAL MEDICINE
Payer: MEDICARE

## 2017-07-06 DIAGNOSIS — R92.8 ABNORMAL SCREENING MAMMOGRAM: ICD-10-CM

## 2017-07-06 PROCEDURE — 77061 BREAST TOMOSYNTHESIS UNI: CPT | Mod: 26,LT,, | Performed by: RADIOLOGY

## 2017-07-06 PROCEDURE — 76642 ULTRASOUND BREAST LIMITED: CPT | Mod: TC,LT

## 2017-07-06 PROCEDURE — 77065 DX MAMMO INCL CAD UNI: CPT | Mod: 26,LT,, | Performed by: RADIOLOGY

## 2017-07-06 PROCEDURE — 77061 BREAST TOMOSYNTHESIS UNI: CPT | Mod: TC,LT

## 2017-07-06 PROCEDURE — 76642 ULTRASOUND BREAST LIMITED: CPT | Mod: 26,LT,, | Performed by: RADIOLOGY

## 2017-11-17 DIAGNOSIS — E11.9 TYPE 2 DIABETES MELLITUS WITHOUT COMPLICATION: ICD-10-CM
